# Patient Record
Sex: FEMALE | URBAN - METROPOLITAN AREA
[De-identification: names, ages, dates, MRNs, and addresses within clinical notes are randomized per-mention and may not be internally consistent; named-entity substitution may affect disease eponyms.]

---

## 2020-04-30 ENCOUNTER — APPOINTMENT (RX ONLY)
Dept: URBAN - METROPOLITAN AREA CLINIC 170 | Facility: CLINIC | Age: 75
Setting detail: DERMATOLOGY
End: 2020-04-30

## 2020-04-30 DIAGNOSIS — L56.2 PHOTOCONTACT DERMATITIS [BERLOQUE DERMATITIS]: ICD-10-CM

## 2020-04-30 PROBLEM — D48.5 NEOPLASM OF UNCERTAIN BEHAVIOR OF SKIN: Status: ACTIVE | Noted: 2020-04-30

## 2020-04-30 PROBLEM — L30.9 DERMATITIS, UNSPECIFIED: Status: ACTIVE | Noted: 2020-04-30

## 2020-04-30 PROCEDURE — ? BIOPSY BY SHAVE METHOD

## 2020-04-30 PROCEDURE — ? TREATMENT REGIMEN

## 2020-04-30 PROCEDURE — ? COUNSELING

## 2020-04-30 PROCEDURE — 11102 TANGNTL BX SKIN SINGLE LES: CPT

## 2020-04-30 PROCEDURE — ? OTHER

## 2020-04-30 PROCEDURE — 99202 OFFICE O/P NEW SF 15 MIN: CPT | Mod: 25

## 2020-04-30 NOTE — PROCEDURE: OTHER
Note Text (......Xxx Chief Complaint.): This diagnosis correlates with the
Detail Level: Detailed
Other (Free Text): Possible BCC, Discussed treatment options (topical vs. ED&C). Pending path results.

## 2020-04-30 NOTE — PROCEDURE: TREATMENT REGIMEN
Initiate Treatment: Wear sun-protective clothing when outdoors.
Detail Level: Detailed
Continue Regimen: Triamcinolone twice day as needed to affected areas x2 weeks. Avoid contact with groin, armpits, and face.

## 2020-05-05 ENCOUNTER — RX ONLY (OUTPATIENT)
Age: 75
Setting detail: RX ONLY
End: 2020-05-05

## 2020-05-05 RX ORDER — IMIQUIMOD 50 MG/G
CREAM TOPICAL
Qty: 1 | Refills: 0 | Status: ERX | COMMUNITY
Start: 2020-05-05

## 2020-07-17 ENCOUNTER — APPOINTMENT (RX ONLY)
Dept: URBAN - METROPOLITAN AREA CLINIC 170 | Facility: CLINIC | Age: 75
Setting detail: DERMATOLOGY
End: 2020-07-17

## 2020-07-17 PROBLEM — C44.319 BASAL CELL CARCINOMA OF SKIN OF OTHER PARTS OF FACE: Status: ACTIVE | Noted: 2020-07-17

## 2020-07-17 PROCEDURE — ? OTHER

## 2020-07-17 PROCEDURE — 99212 OFFICE O/P EST SF 10 MIN: CPT

## 2020-07-17 NOTE — PROCEDURE: OTHER
Other (Free Text): No evidence of residual BCC, will continue to monitor for change.
Detail Level: Detailed
Note Text (......Xxx Chief Complaint.): This diagnosis correlates with the